# Patient Record
(demographics unavailable — no encounter records)

---

## 2025-01-27 NOTE — REVIEW OF SYSTEMS
[Fever] : no fever [Chest Pain] : no chest pain [Palpitations] : no palpitations [Shortness Of Breath] : no shortness of breath [Wheezing] : no wheezing [Cough] : no cough [Dyspnea on Exertion] : not dyspnea on exertion

## 2025-01-27 NOTE — HISTORY OF PRESENT ILLNESS
[FreeTextEntry1] : Interested in GLP-1 CPE [de-identified] : 59 yo Obesity 248--->211---> 257  Wants medication  No gallstones hemorrhoidectomy 6/26/2023 loose stools and urgency presentation  colon 5/9/2023   gets Q 5 yrs  no polyp  Polyp before so Q 5 YRS [employer]   tredmill hurts ankle  weights   walk outside daily  Walk a mile and back almost daily ETOH every 2-4 weeks  DAY TRADING  stressed  No buy and hold

## 2025-01-27 NOTE — COUNSELING
[Fall prevention counseling provided] : Fall prevention counseling provided [Adequate lighting] : Adequate lighting [No throw rugs] : No throw rugs [Encouraged to increase physical activity] : Encouraged to increase physical activity

## 2025-01-27 NOTE — HISTORY OF PRESENT ILLNESS
[FreeTextEntry1] : Interested in GLP-1 CPE [de-identified] : 57 yo Obesity 248--->211---> 257  Wants medication  No gallstones hemorrhoidectomy 6/26/2023 loose stools and urgency presentation  colon 5/9/2023   gets Q 5 yrs  no polyp  Polyp before so Q 5 YRS [employer]   tredmill hurts ankle  weights   walk outside daily  Walk a mile and back almost daily ETOH every 2-4 weeks  DAY TRADING  stressed  No buy and hold

## 2025-01-27 NOTE — HEALTH RISK ASSESSMENT
[0] : 2) Feeling down, depressed, or hopeless: Not at all (0) [Never] : Never [YES] : Yes [Are there any unlocked firearms stored in your household?] : There are unlocked firearms in the household. [Have you attended a firearm safety workshop or class?] : A firearm safety workshop or class has been attended. [[id number]] : 0 [de-identified] : rare cigar [Are there any firearms stored in your household that are loaded?] : No firearms are stored in the household loaded. [Are there any children in your household?] : No children are in the household. [AdvancecareDate] : 01/25

## 2025-01-27 NOTE — PHYSICAL EXAM
[No Acute Distress] : no acute distress [Well-Appearing] : well-appearing [No Respiratory Distress] : no respiratory distress  [No Accessory Muscle Use] : no accessory muscle use [Clear to Auscultation] : lungs were clear to auscultation bilaterally [Normal Rate] : normal rate  [Regular Rhythm] : with a regular rhythm [Normal S1, S2] : normal S1 and S2 [No Edema] : there was no peripheral edema [Soft] : abdomen soft [Non Tender] : non-tender [Non-distended] : non-distended [No HSM] : no HSM [Normal Bowel Sounds] : normal bowel sounds [No CVA Tenderness] : no CVA  tenderness [No Joint Swelling] : no joint swelling [No Rash] : no rash [No Skin Lesions] : no skin lesions [Coordination Grossly Intact] : coordination grossly intact [No Focal Deficits] : no focal deficits [Normal Gait] : normal gait [Normal Mood] : the mood was normal

## 2025-01-27 NOTE — HEALTH RISK ASSESSMENT
[0] : 2) Feeling down, depressed, or hopeless: Not at all (0) [Never] : Never [YES] : Yes [Are there any unlocked firearms stored in your household?] : There are unlocked firearms in the household. [Have you attended a firearm safety workshop or class?] : A firearm safety workshop or class has been attended. [[id number]] : 0 [de-identified] : rare cigar [Are there any firearms stored in your household that are loaded?] : No firearms are stored in the household loaded. [Are there any children in your household?] : No children are in the household. [AdvancecareDate] : 01/25

## 2025-03-19 NOTE — HISTORY OF PRESENT ILLNESS
[Home] : at home, [unfilled] , at the time of the visit. [Medical Office: ([facility])___] : at the medical office located in  [Telehealth (audio & video)] : This visit was provided via telehealth using real-time 2-way audio visual technology. [FreeTextEntry8] : 59 yo male Obesity Has done weight training and aerobic. No pancreatitis nor thyroid issues. Started 0.25mg / week  started Wegovy Had a post nasal drip each time he took it.

## 2025-03-19 NOTE — ASSESSMENT
[FreeTextEntry1] : Had constipation and early satiety on EARLY LOW DOSE   Caution with this medication and stop if abdominal pain

## 2025-05-12 NOTE — CONSULT LETTER
[Dear  ___] : Dear  [unfilled], [Consult Letter:] : I had the pleasure of evaluating your patient, [unfilled]. [Please see my note below.] : Please see my note below. [Consult Closing:] : Thank you very much for allowing me to participate in the care of this patient.  If you have any questions, please do not hesitate to contact me. [Sincerely,] : Sincerely, [FreeTextEntry3] : Hannah Mendez MD Otolaryngology and Cranial Base Surgery  Attending Physician- Department of Otolaryngology and Head & Neck Surgery  [facility] -[employer] [facility] Office: [phone]  Fax: [phone]

## 2025-05-12 NOTE — HISTORY OF PRESENT ILLNESS
[Home] : at home, [unfilled] , at the time of the visit. [Medical Office: ([facility])___] : at the medical office located in  [Telehealth (audio & video)] : This visit was provided via telehealth using real-time 2-way audio visual technology. [FreeTextEntry8] : 58 yo male Obesity PND vand COUGH on 0.25Wegovy Now on 0.5mg Wegovy  Can't eat as much CONSTIPATION still  start apple and KIWI Increase Wegovy 1mg weekly No N/V no abdominal pain  No further cough nor PND

## 2025-05-12 NOTE — PROCEDURE
[FreeTextEntry3] : Cerume removed with curette. After removal of cerumen canal noted to be normal without edema, purulence or inflammation. Patient tolerated procedure well.

## 2025-05-12 NOTE — END OF VISIT
[FreeTextEntry3] : I personally saw and examined Mr. ADELSO GUERRERO in detail this visit today. I personally reviewed the HPI, PMH, FH, SH, ROS and medications/allergies. I have spoken to LAKEISHA Navarrete regarding the history and have personally determined the assessment and plan of care, and documented this myself. I was present and participated in all key portions of the encounter and all procedures noted above. I have made changes in the body of the note where appropriate.   Attesting Faculty: See Attending Signature Below

## 2025-05-12 NOTE — HISTORY OF PRESENT ILLNESS
[de-identified] : 60y/o male who came in for hearing eval. Feels his hearing may have decreased. He has had tinnitus for many years but is on and off. He notes no ear issues, pain, drainage, or ear surgery.

## 2025-05-12 NOTE — ASSESSMENT
[FreeTextEntry1] : 58y/o male with  Ear Exam:  - Audio shows hearing is WNL except mild left 8k loss - will plan for yearly audio to follow this and advised him to come in sooner for repeat audio if any changes (worsening hearing, harder to understand people, ringing in one ear only) - f/u annual